# Patient Record
Sex: FEMALE | Race: BLACK OR AFRICAN AMERICAN | NOT HISPANIC OR LATINO
[De-identification: names, ages, dates, MRNs, and addresses within clinical notes are randomized per-mention and may not be internally consistent; named-entity substitution may affect disease eponyms.]

---

## 2018-11-19 PROBLEM — Z00.00 ENCOUNTER FOR PREVENTIVE HEALTH EXAMINATION: Status: ACTIVE | Noted: 2018-11-19

## 2018-12-19 ENCOUNTER — APPOINTMENT (OUTPATIENT)
Dept: UROGYNECOLOGY | Facility: CLINIC | Age: 83
End: 2018-12-19
Payer: MEDICARE

## 2018-12-19 VITALS
SYSTOLIC BLOOD PRESSURE: 111 MMHG | BODY MASS INDEX: 17.75 KG/M2 | WEIGHT: 104 LBS | DIASTOLIC BLOOD PRESSURE: 69 MMHG | HEIGHT: 64 IN

## 2018-12-19 PROCEDURE — 99204 OFFICE O/P NEW MOD 45 MIN: CPT | Mod: 25

## 2018-12-19 PROCEDURE — 51798 US URINE CAPACITY MEASURE: CPT

## 2018-12-21 LAB — BACTERIA UR CULT: NORMAL

## 2019-01-16 ENCOUNTER — APPOINTMENT (OUTPATIENT)
Dept: UROGYNECOLOGY | Facility: CLINIC | Age: 84
End: 2019-01-16
Payer: MEDICARE

## 2019-01-16 VITALS
BODY MASS INDEX: 17.75 KG/M2 | HEIGHT: 64 IN | WEIGHT: 104 LBS | SYSTOLIC BLOOD PRESSURE: 115 MMHG | DIASTOLIC BLOOD PRESSURE: 70 MMHG

## 2019-01-16 PROCEDURE — 52000 CYSTOURETHROSCOPY: CPT

## 2019-01-30 ENCOUNTER — APPOINTMENT (OUTPATIENT)
Dept: UROGYNECOLOGY | Facility: CLINIC | Age: 84
End: 2019-01-30
Payer: MEDICARE

## 2019-01-30 DIAGNOSIS — Z87.440 PERSONAL HISTORY OF URINARY (TRACT) INFECTIONS: ICD-10-CM

## 2019-01-30 PROCEDURE — 99214 OFFICE O/P EST MOD 30 MIN: CPT

## 2019-01-30 NOTE — LETTER BODY
[Dear  ___] : Dear  [unfilled], [I had the pleasure of evaluating your patient, [unfilled]. Thank you for referring Ms. [unfilled] for consultation for ___] : I had the pleasure of evaluating your patient, [unfilled]. Thank you for referring Ms. [unfilled] for consultation for [unfilled]. [Attached please find my note.] : Attached please find my note. [Thank you very much for allowing me to participate in the care of this patient. If you have any questions, please do not hesitate to contact me] : Thank you very much for allowing me to participate in the care of this patient. If you have any questions, please do not hesitate to contact me. [DrJj  ___] : Dr. CHEUNG

## 2019-01-30 NOTE — ASSESSMENT
[FreeTextEntry1] : She was given another voiding diary form for her to complete.  Also, her urine was sent for cx\par Estrogen cream was re-prescribed and a written instructions were provided\par She is scheduled to return in 1-2 months

## 2019-01-30 NOTE — HISTORY OF PRESENT ILLNESS
[FreeTextEntry1] : The pt was asked to complete a 3 day voiding diary but forgot.\par Also, she did not  the estrogen cream\par She reports dysuria for 1 wk.  Denies f/c, n/v

## 2019-02-03 LAB — BACTERIA UR CULT: ABNORMAL

## 2019-02-03 RX ORDER — AMOXICILLIN AND CLAVULANATE POTASSIUM 500; 125 MG/1; MG/1
500-125 TABLET, FILM COATED ORAL
Qty: 10 | Refills: 0 | Status: ACTIVE | COMMUNITY
Start: 2019-02-03 | End: 1900-01-01

## 2019-03-06 ENCOUNTER — APPOINTMENT (OUTPATIENT)
Dept: UROGYNECOLOGY | Facility: CLINIC | Age: 84
End: 2019-03-06
Payer: MEDICARE

## 2019-03-06 PROCEDURE — 99213 OFFICE O/P EST LOW 20 MIN: CPT

## 2019-03-06 NOTE — ASSESSMENT
[FreeTextEntry1] : Patient to continue using estrogen cream twice a week. \par Discussed pelvic floor physical therapy or anticholinergic medication for urinary urgency and nocturia. \par Patient prefers to start medication at this time.Will start patient on Detrol LA 4mg.  Medication instructions reviewed with patient. Patient to return to office in 1 month for follow up. Patient verbalized understanding of medication instructions and plan of care.

## 2019-03-06 NOTE — HISTORY OF PRESENT ILLNESS
[FreeTextEntry1] : 83 yr old female presents for follow up for nocturia and vaginal atrophy. Patient reports using estrogen cream  twice a week. Denies dysuria today. Still reports nocturia 3-4 times per night as well as intermittent urinary frequency. \par Reports urinary urgency. Denies urinary leakage before reaching the restroom. \par As per voiding diary; patient consumes 4 cups of water, 1 cup coffee, 1 cup orange, 1 cup milk per day. \par

## 2019-03-06 NOTE — PHYSICAL EXAM
[No Acute Distress] : in no acute distress [Well developed] : well developed [Well Nourished] : ~L well nourished [Good Hygeine] : demonstrates good hygeine [Oriented x3] : oriented to person, place, and time [Normal Memory] : ~T memory was ~L unimpaired [Normal Mood/Affect] : mood and affect are normal [Normal Gait] : gait was normal

## 2019-04-10 ENCOUNTER — APPOINTMENT (OUTPATIENT)
Dept: UROGYNECOLOGY | Facility: CLINIC | Age: 84
End: 2019-04-10
Payer: MEDICARE

## 2019-04-10 PROCEDURE — 99213 OFFICE O/P EST LOW 20 MIN: CPT

## 2019-04-10 NOTE — ASSESSMENT
[FreeTextEntry1] : Doing well on Detrol LA 4 mg.  Prescribed 6 months course of Detrol.\par Encouraged her to continue the Estragen cream and f/u in 6 months\par

## 2019-04-10 NOTE — HISTORY OF PRESENT ILLNESS
[FreeTextEntry1] : The pt is here for a f/u visit for OAB with urgency UI.\par She was placed on Detrol LA 4 mg 1 month ago,\par She feels much improvement.\par Has nocturia 2x and daytime urinary freq has also improved significantly.\par She denies urgency UI.  She is able to void wo difficulty.\par Has occ constipation

## 2019-10-09 ENCOUNTER — APPOINTMENT (OUTPATIENT)
Dept: UROGYNECOLOGY | Facility: CLINIC | Age: 84
End: 2019-10-09
Payer: MEDICARE

## 2019-10-09 PROCEDURE — 99213 OFFICE O/P EST LOW 20 MIN: CPT

## 2019-10-09 RX ORDER — TOLTERODINE TARTRATE 4 MG/1
4 CAPSULE, EXTENDED RELEASE ORAL
Qty: 60 | Refills: 2 | Status: ACTIVE | COMMUNITY
Start: 2019-10-09 | End: 1900-01-01

## 2019-10-09 NOTE — ASSESSMENT
[FreeTextEntry1] : She was advised to increase liquid intake and chew gum for dry mouth.\par Detrol LA 4 mg prescribed for 1 yr

## 2019-10-09 NOTE — HISTORY OF PRESENT ILLNESS
[FreeTextEntry1] : The pt is here for a f/u visit for OAB.\par She is currently on Detrol LA 4 mg and is doing well.\par Has BM Q2-3 days, normal. Has worsening dry mouth but is tolerable\par She was admitted for a small CVA and denies residual symptoms.\par She has an appt with her neurologist

## 2019-10-14 ENCOUNTER — OTHER (OUTPATIENT)
Age: 84
End: 2019-10-14

## 2019-10-14 LAB — BACTERIA UR CULT: ABNORMAL

## 2019-10-14 RX ORDER — SULFAMETHOXAZOLE AND TRIMETHOPRIM 800; 160 MG/1; MG/1
800-160 TABLET ORAL TWICE DAILY
Qty: 10 | Refills: 0 | Status: ACTIVE | COMMUNITY
Start: 2019-10-14 | End: 1900-01-01

## 2020-07-22 ENCOUNTER — APPOINTMENT (OUTPATIENT)
Dept: UROGYNECOLOGY | Facility: CLINIC | Age: 85
End: 2020-07-22
Payer: MEDICARE

## 2020-07-22 VITALS — DIASTOLIC BLOOD PRESSURE: 87 MMHG | SYSTOLIC BLOOD PRESSURE: 133 MMHG | HEART RATE: 91 BPM

## 2020-07-22 PROCEDURE — 99214 OFFICE O/P EST MOD 30 MIN: CPT

## 2020-07-22 NOTE — LETTER BODY
[Dear  ___] : Dear  [unfilled], [I had the pleasure of evaluating your patient, [unfilled]. Thank you for referring Ms. [unfilled] for consultation for ___] : I had the pleasure of evaluating your patient, [unfilled]. Thank you for referring Ms. [unfilled] for consultation for [unfilled]. [Attached please find my note.] : Attached please find my note. [DrJj  ___] : Dr. CHEUNG  [Thank you very much for allowing me to participate in the care of this patient. If you have any questions, please do not hesitate to contact me] : Thank you very much for allowing me to participate in the care of this patient. If you have any questions, please do not hesitate to contact me.

## 2020-07-22 NOTE — ASSESSMENT
[FreeTextEntry1] : Although she has urinary frequency, I believe that it is not related to her bladder function but rather cardiac function.  She voids with a large volume and has freq nocturia with a large volume suggestive of peripheral vascular or cardiac issues.  She is scheduled to see a cardiologist next wk.\par She was given a refill on Detrol LA \par Urine was sent for cx

## 2020-07-22 NOTE — HISTORY OF PRESENT ILLNESS
[FreeTextEntry1] : The pt is here for a f/u visit for OAB.\par She has been taking Detrol LA 4 mg and was doing well\par However, she has been noticing more freq even on Detrol.\par She voids Q2-3 hrs with a large volume\par She has nocturia 4-5 x with a large volume.\par She consumes 5-6 glasses of water, 2 glasses of juice, 1-2 cups of coffee.\par She feels complete emptying.\par She has a cardiac issue but does not remember the dx.

## 2020-07-26 LAB — BACTERIA UR CULT: NORMAL

## 2020-12-21 PROBLEM — Z87.440 HISTORY OF URINARY TRACT INFECTION: Status: RESOLVED | Noted: 2019-01-30 | Resolved: 2020-12-21

## 2021-03-29 ENCOUNTER — RX RENEWAL (OUTPATIENT)
Age: 86
End: 2021-03-29

## 2021-03-29 RX ORDER — TOLTERODINE TARTRATE 4 MG/1
4 CAPSULE, EXTENDED RELEASE ORAL
Qty: 90 | Refills: 1 | Status: ACTIVE | COMMUNITY
Start: 2020-07-22 | End: 1900-01-01

## 2021-06-16 ENCOUNTER — APPOINTMENT (OUTPATIENT)
Dept: UROGYNECOLOGY | Facility: CLINIC | Age: 86
End: 2021-06-16
Payer: MEDICARE

## 2021-06-16 DIAGNOSIS — I63.9 CEREBRAL INFARCTION, UNSPECIFIED: ICD-10-CM

## 2021-06-16 PROCEDURE — 99212 OFFICE O/P EST SF 10 MIN: CPT

## 2021-06-16 NOTE — ASSESSMENT
[FreeTextEntry1] : She was re-prescribed with estrogen cream and Detrol LA 4 mg.\par She will f/u in 2 months

## 2021-06-16 NOTE — HISTORY OF PRESENT ILLNESS
[FreeTextEntry1] : The pt is here for OAB.  She did not take either meds.\par She was also referred to cardiology but did not comply.\par

## 2021-08-04 ENCOUNTER — APPOINTMENT (OUTPATIENT)
Dept: UROGYNECOLOGY | Facility: CLINIC | Age: 86
End: 2021-08-04

## 2021-08-26 ENCOUNTER — APPOINTMENT (OUTPATIENT)
Dept: UROGYNECOLOGY | Facility: CLINIC | Age: 86
End: 2021-08-26
Payer: MEDICARE

## 2021-08-26 PROCEDURE — 99213 OFFICE O/P EST LOW 20 MIN: CPT

## 2021-08-26 NOTE — ASSESSMENT
[FreeTextEntry1] : She was advised to discontinue the current med and start Myrbetrique 25 mg.\par Denies hx of HTN\par She will continue with Estrogen cream.\par F/U in 2-3 wks for BP check

## 2021-09-07 ENCOUNTER — RX RENEWAL (OUTPATIENT)
Age: 86
End: 2021-09-07

## 2021-09-13 ENCOUNTER — APPOINTMENT (OUTPATIENT)
Dept: UROGYNECOLOGY | Facility: CLINIC | Age: 86
End: 2021-09-13
Payer: MEDICARE

## 2021-09-13 VITALS — SYSTOLIC BLOOD PRESSURE: 165 MMHG | HEART RATE: 95 BPM | OXYGEN SATURATION: 98 % | DIASTOLIC BLOOD PRESSURE: 93 MMHG

## 2021-09-13 PROCEDURE — 99213 OFFICE O/P EST LOW 20 MIN: CPT

## 2021-09-13 NOTE — LETTER BODY
[Dear  ___] : Dear  [unfilled], [I had the pleasure of evaluating your patient, [unfilled]. Thank you for referring Ms. [unfilled] for consultation for ___] : I had the pleasure of evaluating your patient, [unfilled]. Thank you for referring Ms. [unfilled] for consultation for [unfilled]. [Attached please find my note.] : Attached please find my note. [Thank you very much for allowing me to participate in the care of this patient. If you have any questions, please do not hesitate to contact me] : Thank you very much for allowing me to participate in the care of this patient. If you have any questions, please do not hesitate to contact me. [FreeTextEntry1] : intractable OAB and vaginal atrophy

## 2021-09-13 NOTE — ASSESSMENT
[FreeTextEntry1] : She was advised to discontinue her med.\par She was referred to Dr. Stewart for possible Botox injection\par \par For vaginal burning sensation, she was advised to try estrogen cream nightly for 1 month.\par If no improvement, she will f/u with Dr. Wilkes

## 2021-09-13 NOTE — HISTORY OF PRESENT ILLNESS
[FreeTextEntry1] : The pt was started on Myrbetrique 25 mg for tx of OAB after failing Detrol LA.\par She reports no improvement.

## 2021-12-01 ENCOUNTER — APPOINTMENT (OUTPATIENT)
Dept: UROLOGY | Facility: CLINIC | Age: 86
End: 2021-12-01
Payer: MEDICARE

## 2021-12-01 VITALS
BODY MASS INDEX: 18.82 KG/M2 | HEART RATE: 82 BPM | TEMPERATURE: 96.6 F | RESPIRATION RATE: 16 BRPM | WEIGHT: 101 LBS | OXYGEN SATURATION: 96 % | SYSTOLIC BLOOD PRESSURE: 107 MMHG | HEIGHT: 61.5 IN | DIASTOLIC BLOOD PRESSURE: 65 MMHG

## 2021-12-01 DIAGNOSIS — N95.2 POSTMENOPAUSAL ATROPHIC VAGINITIS: ICD-10-CM

## 2021-12-01 DIAGNOSIS — Z87.891 PERSONAL HISTORY OF NICOTINE DEPENDENCE: ICD-10-CM

## 2021-12-01 PROCEDURE — 51798 US URINE CAPACITY MEASURE: CPT

## 2021-12-01 PROCEDURE — 99204 OFFICE O/P NEW MOD 45 MIN: CPT

## 2021-12-01 NOTE — PHYSICAL EXAM
[Normal Appearance] : normal appearance [Well Groomed] : well groomed [] : no respiratory distress [Abdomen Tenderness] : non-tender [FreeTextEntry1] : Walks with cane [Skin Color & Pigmentation] : normal skin color and pigmentation [Affect] : the affect was normal [Mood] : the mood was normal [Not Anxious] : not anxious

## 2021-12-01 NOTE — ASSESSMENT
[FreeTextEntry1] : Counseled the patient on constipation and how it can affect the urinary tract. We discussed increasing water intake, daily fruits and vegetables, and sources of fiber.  We recommended 4 servings of whole fruit per day, excluding dried fruit or juices.  We also recommended supplementing soluble fiber intake with gummy fiber #2/day.\par \par OAB wet\par We discussed the symptoms and workup of overactive bladder, with the understanding that we must first rule out outlet obstruction, foreign body, infection/inflammation or malignancy as causes.\par \par We discussed first line therapies for OAB, including dietary changes with reduction of bladder irritants such as caffeine/alcohol, addressing constipation, pelvic floor physical therapy.\par \par We discussed second line therapies for OAB including medications from the anti-cholinergic and B3 agonist categories. Side effects from each category were reviewed.\par \par We also discussed third line therapies for OAB, including tibial nerve stimulation, intravesical bladder Botox, and sacral neuromodulation.\par \par \par PLAN \par Cont myrbetriq 25\par Cont vaginal estrogen\par Cut coffee to decaf only \par Avoid caffeinated teas\par \par Increase fiber for constipation\par \par Percutaneous Tibial Nerve Modulation\par r/b/a procedure discussed with patient\par Patient understands that stimulation will be required for every week, 30 minutes at a time, for 12 weeks.\par After the 12th week, we can extend therapy to every 2-4 weeks in perpetuity\par Patient understands peek effect should be around week #8\par \par UA\par

## 2021-12-01 NOTE — END OF VISIT
[FreeTextEntry3] : The total time spent with the patient includes face to face time as well as time for documentation, ordering medications/labs/procedures, and care coordination, but I acknowledge it does not include time spent on any procedures performed (eg PVR, UDS, Cystoscopy, catheter changes, etc).\par  [Time Spent: ___ minutes] : I have spent [unfilled] minutes of time on the encounter.

## 2021-12-01 NOTE — HISTORY OF PRESENT ILLNESS
[FreeTextEntry1] : 83 year old P2 (vaginal)  F w history of CVA in 2019, pancreatic CA s/p surgery, spinal fusion surgery, OAB wet for at least 3 years with some benefit with Detrol 4 mg with constipation and dry mouth.  Changed to Myrbetriq 25 in 8/2021 with no improvement. \par \par DTF q1-2 hours\par Nocturia 3-4\par UUI  multiple a day\par 1 pullup / day\par \par Coffee - 2 regular / day\par Tea - Barley and black tea\par cola - occasional \par \par Constipation - chronic, BM every 2+ days\par \par She has vaginal atrophy treated with vaginal estrogen cream for 2 years. \par \par \par \par PVR today = 41 cc

## 2021-12-02 LAB
APPEARANCE: CLEAR
BILIRUBIN URINE: NEGATIVE
BLOOD URINE: NEGATIVE
COLOR: YELLOW
GLUCOSE QUALITATIVE U: ABNORMAL
KETONES URINE: NEGATIVE
LEUKOCYTE ESTERASE URINE: NEGATIVE
NITRITE URINE: NEGATIVE
PH URINE: 6.5
PROTEIN URINE: NEGATIVE
SPECIFIC GRAVITY URINE: 1.02
UROBILINOGEN URINE: NORMAL

## 2021-12-29 ENCOUNTER — RX RENEWAL (OUTPATIENT)
Age: 86
End: 2021-12-29

## 2022-01-24 ENCOUNTER — RX RENEWAL (OUTPATIENT)
Age: 87
End: 2022-01-24

## 2022-07-07 RX ORDER — ESTRADIOL 0.1 MG/G
0.1 CREAM VAGINAL
Qty: 42 | Refills: 2 | Status: COMPLETED | COMMUNITY
Start: 2019-01-30 | End: 2022-07-07

## 2022-07-07 RX ORDER — ESTRADIOL 0.1 MG/G
0.1 CREAM VAGINAL
Qty: 1 | Refills: 3 | Status: COMPLETED | COMMUNITY
Start: 2021-06-16 | End: 2022-07-07

## 2022-08-10 ENCOUNTER — APPOINTMENT (OUTPATIENT)
Dept: UROLOGY | Facility: CLINIC | Age: 87
End: 2022-08-10

## 2022-08-10 VITALS
HEART RATE: 84 BPM | DIASTOLIC BLOOD PRESSURE: 56 MMHG | SYSTOLIC BLOOD PRESSURE: 92 MMHG | OXYGEN SATURATION: 96 % | TEMPERATURE: 98 F | BODY MASS INDEX: 18.09 KG/M2 | RESPIRATION RATE: 18 BRPM | WEIGHT: 97.3 LBS

## 2022-08-10 PROCEDURE — 99213 OFFICE O/P EST LOW 20 MIN: CPT

## 2022-08-10 NOTE — ASSESSMENT
[FreeTextEntry1] : renew myrbetriq\par \par renew vaginal estrogen cream\par \par discussed 3rd line oab therapies\par \par she is not interested in SNM\par \par less interested in botox \par \par Percutaneous Tibial Nerve Modulation\par r/b/a procedure discussed with patient\par Patient understands that stimulation will be required for every week, 30 minutes at a time, for 12 weeks.\par After the 12th week, we can extend therapy to every 2-4 weeks in perpetuity\par Patient understands peek effect should be around week #8\par

## 2022-08-10 NOTE — PHYSICAL EXAM
[General Appearance - Well Developed] : well developed [Normal Appearance] : normal appearance [Abdomen Tenderness] : non-tender [Mood] : the mood was normal [Not Anxious] : not anxious [FreeTextEntry1] : uses walker

## 2022-08-10 NOTE — HISTORY OF PRESENT ILLNESS
[FreeTextEntry1] : 87 year old P2 (vaginal) F w history of CVA in 2019, pancreatic CA s/p surgery, spinal fusion surgery, OAB wet for at least 3 years with some benefit with Detrol 4 mg with constipation and dry mouth. Changed to Myrbetriq 25 in 8/2021 with no improvement. \par \par Pt experiencing daytime frequency, nocturia, and UUI requiring pull ups\par \par Drinks coffee daily, regular\par \par Constipation - intermittent\par \par Using vaginal estrogen cream \par \par \par She saw her PCP last month and said her labwork was acceptable (glucosuria at last visit)\par

## 2022-08-31 ENCOUNTER — APPOINTMENT (OUTPATIENT)
Dept: UROLOGY | Facility: CLINIC | Age: 87
End: 2022-08-31

## 2022-08-31 VITALS
TEMPERATURE: 97.3 F | BODY MASS INDEX: 18.35 KG/M2 | DIASTOLIC BLOOD PRESSURE: 51 MMHG | SYSTOLIC BLOOD PRESSURE: 96 MMHG | HEART RATE: 90 BPM | OXYGEN SATURATION: 98 % | RESPIRATION RATE: 17 BRPM | WEIGHT: 98.7 LBS

## 2022-08-31 PROCEDURE — 64566 NEUROELTRD STIM POST TIBIAL: CPT

## 2022-09-07 ENCOUNTER — APPOINTMENT (OUTPATIENT)
Dept: UROLOGY | Facility: CLINIC | Age: 87
End: 2022-09-07

## 2022-09-07 VITALS
HEART RATE: 90 BPM | OXYGEN SATURATION: 97 % | TEMPERATURE: 97.2 F | SYSTOLIC BLOOD PRESSURE: 112 MMHG | DIASTOLIC BLOOD PRESSURE: 67 MMHG | RESPIRATION RATE: 17 BRPM

## 2022-09-07 PROCEDURE — 64566 NEUROELTRD STIM POST TIBIAL: CPT

## 2022-09-14 ENCOUNTER — APPOINTMENT (OUTPATIENT)
Dept: UROLOGY | Facility: CLINIC | Age: 87
End: 2022-09-14

## 2022-09-14 VITALS
OXYGEN SATURATION: 97 % | TEMPERATURE: 97.8 F | HEART RATE: 92 BPM | BODY MASS INDEX: 18.05 KG/M2 | RESPIRATION RATE: 17 BRPM | DIASTOLIC BLOOD PRESSURE: 72 MMHG | SYSTOLIC BLOOD PRESSURE: 116 MMHG | WEIGHT: 97.1 LBS

## 2022-09-14 PROCEDURE — 64566 NEUROELTRD STIM POST TIBIAL: CPT

## 2022-09-21 ENCOUNTER — APPOINTMENT (OUTPATIENT)
Dept: UROLOGY | Facility: CLINIC | Age: 87
End: 2022-09-21

## 2022-09-21 VITALS
HEART RATE: 88 BPM | TEMPERATURE: 97.2 F | OXYGEN SATURATION: 97 % | RESPIRATION RATE: 17 BRPM | SYSTOLIC BLOOD PRESSURE: 100 MMHG | BODY MASS INDEX: 18.03 KG/M2 | DIASTOLIC BLOOD PRESSURE: 59 MMHG | WEIGHT: 97 LBS

## 2022-09-21 PROCEDURE — 64566 NEUROELTRD STIM POST TIBIAL: CPT

## 2022-09-28 ENCOUNTER — APPOINTMENT (OUTPATIENT)
Dept: UROLOGY | Facility: CLINIC | Age: 87
End: 2022-09-28

## 2022-09-28 PROCEDURE — 64566 NEUROELTRD STIM POST TIBIAL: CPT

## 2022-10-05 ENCOUNTER — APPOINTMENT (OUTPATIENT)
Dept: UROLOGY | Facility: CLINIC | Age: 87
End: 2022-10-05

## 2022-10-05 VITALS
RESPIRATION RATE: 17 BRPM | DIASTOLIC BLOOD PRESSURE: 64 MMHG | OXYGEN SATURATION: 98 % | TEMPERATURE: 97.4 F | SYSTOLIC BLOOD PRESSURE: 115 MMHG | WEIGHT: 100.3 LBS | BODY MASS INDEX: 18.64 KG/M2 | HEART RATE: 88 BPM

## 2022-10-05 PROCEDURE — 64566 NEUROELTRD STIM POST TIBIAL: CPT

## 2022-10-12 ENCOUNTER — APPOINTMENT (OUTPATIENT)
Dept: UROLOGY | Facility: CLINIC | Age: 87
End: 2022-10-12

## 2022-10-12 VITALS
RESPIRATION RATE: 17 BRPM | OXYGEN SATURATION: 95 % | BODY MASS INDEX: 18.31 KG/M2 | TEMPERATURE: 96.3 F | DIASTOLIC BLOOD PRESSURE: 58 MMHG | HEART RATE: 89 BPM | WEIGHT: 98.5 LBS | SYSTOLIC BLOOD PRESSURE: 103 MMHG

## 2022-10-12 PROCEDURE — 64566 NEUROELTRD STIM POST TIBIAL: CPT

## 2022-10-19 ENCOUNTER — APPOINTMENT (OUTPATIENT)
Dept: UROLOGY | Facility: CLINIC | Age: 87
End: 2022-10-19

## 2022-10-19 VITALS
SYSTOLIC BLOOD PRESSURE: 147 MMHG | HEART RATE: 88 BPM | DIASTOLIC BLOOD PRESSURE: 77 MMHG | RESPIRATION RATE: 17 BRPM | TEMPERATURE: 95.3 F | WEIGHT: 100.8 LBS | OXYGEN SATURATION: 99 % | BODY MASS INDEX: 18.74 KG/M2

## 2022-10-19 PROCEDURE — 64566 NEUROELTRD STIM POST TIBIAL: CPT

## 2022-10-19 PROCEDURE — 51798 US URINE CAPACITY MEASURE: CPT

## 2022-10-19 RX ORDER — TOLTERODINE TARTRATE 4 MG/1
4 CAPSULE, EXTENDED RELEASE ORAL
Qty: 60 | Refills: 2 | Status: COMPLETED | COMMUNITY
Start: 2020-04-16 | End: 2022-10-19

## 2022-10-19 RX ORDER — MIRABEGRON 25 MG/1
25 TABLET, FILM COATED, EXTENDED RELEASE ORAL
Qty: 90 | Refills: 3 | Status: COMPLETED | COMMUNITY
Start: 2021-08-26 | End: 2022-10-19

## 2022-10-19 RX ORDER — TOLTERODINE TARTRATE 4 MG/1
4 CAPSULE, EXTENDED RELEASE ORAL
Qty: 30 | Refills: 1 | Status: COMPLETED | COMMUNITY
Start: 2019-03-06 | End: 2022-10-19

## 2022-11-16 ENCOUNTER — APPOINTMENT (OUTPATIENT)
Dept: UROLOGY | Facility: CLINIC | Age: 87
End: 2022-11-16

## 2022-11-16 VITALS
BODY MASS INDEX: 18.64 KG/M2 | OXYGEN SATURATION: 96 % | RESPIRATION RATE: 17 BRPM | SYSTOLIC BLOOD PRESSURE: 120 MMHG | DIASTOLIC BLOOD PRESSURE: 73 MMHG | WEIGHT: 100.3 LBS | HEART RATE: 99 BPM | TEMPERATURE: 96.7 F

## 2022-11-16 DIAGNOSIS — R33.9 RETENTION OF URINE, UNSPECIFIED: ICD-10-CM

## 2022-11-16 DIAGNOSIS — N95.8 OTHER SPECIFIED MENOPAUSAL AND PERIMENOPAUSAL DISORDERS: ICD-10-CM

## 2022-11-16 DIAGNOSIS — N32.81 OVERACTIVE BLADDER: ICD-10-CM

## 2022-11-16 DIAGNOSIS — N39.41 URGE INCONTINENCE: ICD-10-CM

## 2022-11-16 PROCEDURE — 99215 OFFICE O/P EST HI 40 MIN: CPT

## 2022-11-16 PROCEDURE — 51798 US URINE CAPACITY MEASURE: CPT

## 2022-11-16 NOTE — END OF VISIT
[Time Spent: ___ minutes] : I have spent [unfilled] minutes of time on the encounter. [FreeTextEntry3] : oab incomplete bladder emptying refratory to PTNS x 12 sessions\par PVR still eelvated, has been on tamsulsoin 0.4 mg qhs\par \par still with OAB wet\par discussed botox vs PNE/SNM\par \par will increase tamsulosin to 0.8 mg qhs\par rto 1 mo pvr check \par \par The total time spent with the patient includes face to face time as well as time for documentation, ordering medications/labs/procedures, and care coordination, but I acknowledge it does not include time spent on any procedures performed (eg PVR, UDS, Cystoscopy, catheter changes, etc).  Time includes reviewing the chart prior to visit, documentation, and correspondence.\par

## 2022-11-16 NOTE — HISTORY OF PRESENT ILLNESS
[FreeTextEntry1] : 87 year old P2 (vaginal) F w history of CVA in 2019, pancreatic CA s/p surgery, spinal fusion surgery, OAB wet for at least 3 years with some benefit with Detrol 4 mg with constipation and dry mouth. Changed to Myrbetriq 25 in 8/2021 with no improvement - Mybetriq was discontinued Oct 2022.\par \par S/P 12 sessions of PTNS for OAB -wet with no improvements \par \par PVR Oct 2022 was 150cc - started on Tamsulosin 0.8mg po qhs - pt only took 1 cap of 0.4mg x 1 month\par \par PVR today 120cc\par giving second change to void\par PVR 86cc \par \par

## 2022-11-16 NOTE — ASSESSMENT
[FreeTextEntry1] : Plan:\par \par - PVR today 86cc\par \par - Advised to increase to Tamsulosin to 0.8mg PO QHS \par \par - Con't estradiol vaginal cream, TIW \par \par - Discussed Botox and PNE - pt aware that botox can increase risk for incomplete bladder emptying\par \par - RTO 1 month for repeat PVR

## 2022-12-14 ENCOUNTER — APPOINTMENT (OUTPATIENT)
Dept: UROLOGY | Facility: CLINIC | Age: 87
End: 2022-12-14

## 2023-07-18 ENCOUNTER — RX RENEWAL (OUTPATIENT)
Age: 88
End: 2023-07-18

## 2023-07-18 RX ORDER — TAMSULOSIN HYDROCHLORIDE 0.4 MG/1
0.4 CAPSULE ORAL
Qty: 60 | Refills: 3 | Status: ACTIVE | COMMUNITY
Start: 2022-10-19 | End: 1900-01-01

## 2023-10-25 RX ORDER — ESTRADIOL 0.1 MG/G
0.1 CREAM VAGINAL
Qty: 1 | Refills: 5 | Status: ACTIVE | COMMUNITY
Start: 2022-07-07 | End: 1900-01-01